# Patient Record
Sex: FEMALE | Race: OTHER | URBAN - METROPOLITAN AREA
[De-identification: names, ages, dates, MRNs, and addresses within clinical notes are randomized per-mention and may not be internally consistent; named-entity substitution may affect disease eponyms.]

---
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
## 2018-02-13 ENCOUNTER — EMERGENCY (EMERGENCY)
Facility: HOSPITAL | Age: 18
LOS: 0 days | Discharge: HOME | End: 2018-02-13
Attending: STUDENT IN AN ORGANIZED HEALTH CARE EDUCATION/TRAINING PROGRAM | Admitting: STUDENT IN AN ORGANIZED HEALTH CARE EDUCATION/TRAINING PROGRAM
Payer: SELF-PAY

## 2018-02-13 VITALS
OXYGEN SATURATION: 100 % | HEART RATE: 84 BPM | DIASTOLIC BLOOD PRESSURE: 60 MMHG | RESPIRATION RATE: 16 BRPM | SYSTOLIC BLOOD PRESSURE: 133 MMHG | TEMPERATURE: 98 F

## 2018-02-13 VITALS
WEIGHT: 156.09 LBS | TEMPERATURE: 98 F | HEART RATE: 89 BPM | DIASTOLIC BLOOD PRESSURE: 58 MMHG | HEIGHT: 62 IN | OXYGEN SATURATION: 100 % | SYSTOLIC BLOOD PRESSURE: 138 MMHG | RESPIRATION RATE: 18 BRPM

## 2018-02-13 DIAGNOSIS — Z91.5 PERSONAL HISTORY OF SELF-HARM: ICD-10-CM

## 2018-02-13 DIAGNOSIS — R69 ILLNESS, UNSPECIFIED: ICD-10-CM

## 2018-02-13 DIAGNOSIS — F43.29 ADJUSTMENT DISORDER WITH OTHER SYMPTOMS: ICD-10-CM

## 2018-02-13 DIAGNOSIS — Z79.899 OTHER LONG TERM (CURRENT) DRUG THERAPY: ICD-10-CM

## 2018-02-13 DIAGNOSIS — R45.851 SUICIDAL IDEATIONS: ICD-10-CM

## 2018-02-13 DIAGNOSIS — F17.210 NICOTINE DEPENDENCE, CIGARETTES, UNCOMPLICATED: ICD-10-CM

## 2018-02-13 DIAGNOSIS — F32.9 MAJOR DEPRESSIVE DISORDER, SINGLE EPISODE, UNSPECIFIED: ICD-10-CM

## 2018-02-13 LAB
ALBUMIN SERPL ELPH-MCNC: 3.5 G/DL — SIGNIFICANT CHANGE UP (ref 3.3–5)
ALP SERPL-CCNC: 99 U/L — SIGNIFICANT CHANGE UP (ref 40–120)
ALT FLD-CCNC: 25 U/L — SIGNIFICANT CHANGE UP (ref 12–78)
ANION GAP SERPL CALC-SCNC: 6 MMOL/L — SIGNIFICANT CHANGE UP (ref 5–17)
APAP SERPL-MCNC: <2 UG/ML — LOW (ref 10–30)
AST SERPL-CCNC: 14 U/L — LOW (ref 15–37)
BASOPHILS # BLD AUTO: 0 K/UL — SIGNIFICANT CHANGE UP (ref 0–0.2)
BASOPHILS NFR BLD AUTO: 0.3 % — SIGNIFICANT CHANGE UP (ref 0–2)
BILIRUB DIRECT SERPL-MCNC: 0.1 MG/DL — SIGNIFICANT CHANGE UP (ref 0–0.2)
BILIRUB INDIRECT FLD-MCNC: 0.4 MG/DL — SIGNIFICANT CHANGE UP (ref 0.2–1)
BILIRUB SERPL-MCNC: 0.5 MG/DL — SIGNIFICANT CHANGE UP (ref 0.2–1.2)
BUN SERPL-MCNC: 9 MG/DL — SIGNIFICANT CHANGE UP (ref 7–23)
CALCIUM SERPL-MCNC: 8.6 MG/DL — SIGNIFICANT CHANGE UP (ref 8.5–10.1)
CHLORIDE SERPL-SCNC: 107 MMOL/L — SIGNIFICANT CHANGE UP (ref 96–108)
CO2 SERPL-SCNC: 26 MMOL/L — SIGNIFICANT CHANGE UP (ref 22–31)
CREAT SERPL-MCNC: 0.58 MG/DL — SIGNIFICANT CHANGE UP (ref 0.5–1.3)
EOSINOPHIL # BLD AUTO: 0.2 K/UL — SIGNIFICANT CHANGE UP (ref 0–0.5)
EOSINOPHIL NFR BLD AUTO: 2.1 % — SIGNIFICANT CHANGE UP (ref 0–6)
ETHANOL SERPL-MCNC: <10 MG/DL — SIGNIFICANT CHANGE UP (ref 0–10)
GLUCOSE SERPL-MCNC: 108 MG/DL — HIGH (ref 70–99)
HCG SERPL-ACNC: <1 MIU/ML — SIGNIFICANT CHANGE UP
HCT VFR BLD CALC: 40.2 % — SIGNIFICANT CHANGE UP (ref 34.5–45)
HGB BLD-MCNC: 13.6 G/DL — SIGNIFICANT CHANGE UP (ref 11.5–15.5)
HIV 1 & 2 AB SERPL IA.RAPID: SIGNIFICANT CHANGE UP
LYMPHOCYTES # BLD AUTO: 2.1 K/UL — SIGNIFICANT CHANGE UP (ref 1–3.3)
LYMPHOCYTES # BLD AUTO: 21.6 % — SIGNIFICANT CHANGE UP (ref 13–44)
MCHC RBC-ENTMCNC: 29.1 PG — SIGNIFICANT CHANGE UP (ref 27–34)
MCHC RBC-ENTMCNC: 33.9 GM/DL — SIGNIFICANT CHANGE UP (ref 32–36)
MCV RBC AUTO: 85.9 FL — SIGNIFICANT CHANGE UP (ref 80–100)
MONOCYTES # BLD AUTO: 0.4 K/UL — SIGNIFICANT CHANGE UP (ref 0–0.9)
MONOCYTES NFR BLD AUTO: 4.1 % — SIGNIFICANT CHANGE UP (ref 2–14)
NEUTROPHILS # BLD AUTO: 6.9 K/UL — SIGNIFICANT CHANGE UP (ref 1.8–7.4)
NEUTROPHILS NFR BLD AUTO: 71.9 % — SIGNIFICANT CHANGE UP (ref 43–77)
PLATELET # BLD AUTO: 261 K/UL — SIGNIFICANT CHANGE UP (ref 150–400)
POTASSIUM SERPL-MCNC: 3.5 MMOL/L — SIGNIFICANT CHANGE UP (ref 3.5–5.3)
POTASSIUM SERPL-SCNC: 3.5 MMOL/L — SIGNIFICANT CHANGE UP (ref 3.5–5.3)
PROT SERPL-MCNC: 7.3 GM/DL — SIGNIFICANT CHANGE UP (ref 6–8.3)
RBC # BLD: 4.68 M/UL — SIGNIFICANT CHANGE UP (ref 3.8–5.2)
RBC # FLD: 12 % — SIGNIFICANT CHANGE UP (ref 10.3–14.5)
SALICYLATES SERPL-MCNC: <1.7 MG/DL — LOW (ref 2.8–20)
SODIUM SERPL-SCNC: 139 MMOL/L — SIGNIFICANT CHANGE UP (ref 135–145)
TSH SERPL-MCNC: 0.75 UU/ML — SIGNIFICANT CHANGE UP (ref 0.36–3.74)
WBC # BLD: 9.5 K/UL — SIGNIFICANT CHANGE UP (ref 3.8–10.5)
WBC # FLD AUTO: 9.5 K/UL — SIGNIFICANT CHANGE UP (ref 3.8–10.5)

## 2018-02-13 PROCEDURE — 99285 EMERGENCY DEPT VISIT HI MDM: CPT

## 2018-02-13 PROCEDURE — 93010 ELECTROCARDIOGRAM REPORT: CPT

## 2018-02-13 NOTE — ED PROVIDER NOTE - OBJECTIVE STATEMENT
16 yo female with suicidal ideation; no plan; patient reports that she has been having thoughts to hurt herself for " a long time"- patient does not see therapist or psychiatrist; has been hospitalized over 3 years ago for psychiatric illness; reports difficulty with dealing with stressors at home and school; patient's uncle also recently  per patient; reports that she "sometimes smokes, sometimes drinks alcohol," no illicit drug use.
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
## 2018-02-13 NOTE — ED PROVIDER NOTE - PROGRESS NOTE DETAILS
Khloe DO: Psych consult ordered; patient placed on 1:1 Khloe DO: S/o to Dr. Horne pending labs, psych eval. AJM: Pt receievd in signout. stable. seen by psych and cleared. outpatient follow up arranged by psych. will dc

## 2018-02-13 NOTE — ED BEHAVIORAL HEALTH ASSESSMENT NOTE - RISK ASSESSMENT
min risk of self harm, but elevated risk of unintentional self harm due to high risk, promiscuous behavior and ineffective coping skills.  Pt has no insurance an limited support groups.  Pt advised to discuss with school adviser.

## 2018-02-13 NOTE — ED ADULT NURSE NOTE - CHPI ED SYMPTOMS NEG
no weakness/no agitation/no weight loss/no homicidal/no hallucinations/no confusion/no disorientation/no paranoia/no suicidal/no change in level of consciousness
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
## 2018-02-13 NOTE — ED BEHAVIORAL HEALTH ASSESSMENT NOTE - SUICIDE PROTECTIVE FACTORS
Supportive social network or family/Responsibility to family and others/Future oriented/High spirituality
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
## 2018-02-13 NOTE — ED ADULT NURSE NOTE - OBJECTIVE STATEMENT
pt states increasing depression over the past 6 months. states decreased pleasure in activities, desire to be alone, crying, anxiety, and "wanting to be in the dark" states wanted to self mutilate however threw out her razor. denies SI, HI, hallucinations, drug or alcohol use.

## 2020-12-02 NOTE — ED ADULT NURSE NOTE - PRO INTERPRETER NEED 2
Verified name and  of patient. Mat, physical therapist at [facility], calling to report that he is currently treating patient and has noticed swelling to her left leg.   He states that patient reports that leg is tender to touch and t English

## 2022-03-25 ENCOUNTER — EMERGENCY (EMERGENCY)
Facility: HOSPITAL | Age: 22
LOS: 0 days | Discharge: HOME | End: 2022-03-26
Attending: EMERGENCY MEDICINE
Payer: MEDICAID

## 2022-03-25 VITALS
HEART RATE: 98 BPM | OXYGEN SATURATION: 100 % | SYSTOLIC BLOOD PRESSURE: 132 MMHG | TEMPERATURE: 98 F | DIASTOLIC BLOOD PRESSURE: 73 MMHG | RESPIRATION RATE: 19 BRPM

## 2022-03-25 VITALS — HEIGHT: 62 IN | WEIGHT: 195.11 LBS

## 2022-03-25 DIAGNOSIS — R53.1 WEAKNESS: ICD-10-CM

## 2022-03-25 DIAGNOSIS — R42 DIZZINESS AND GIDDINESS: ICD-10-CM

## 2022-03-25 DIAGNOSIS — F41.0 PANIC DISORDER [EPISODIC PAROXYSMAL ANXIETY]: ICD-10-CM

## 2022-03-25 DIAGNOSIS — F41.9 ANXIETY DISORDER, UNSPECIFIED: ICD-10-CM

## 2022-03-25 LAB
ALBUMIN SERPL ELPH-MCNC: 3.8 G/DL — SIGNIFICANT CHANGE UP (ref 3.3–5)
ALP SERPL-CCNC: 98 U/L — SIGNIFICANT CHANGE UP (ref 40–120)
ALT FLD-CCNC: 32 U/L — SIGNIFICANT CHANGE UP (ref 12–78)
ANION GAP SERPL CALC-SCNC: 4 MMOL/L — LOW (ref 5–17)
APPEARANCE UR: ABNORMAL
AST SERPL-CCNC: 20 U/L — SIGNIFICANT CHANGE UP (ref 15–37)
BILIRUB SERPL-MCNC: 0.5 MG/DL — SIGNIFICANT CHANGE UP (ref 0.2–1.2)
BILIRUB UR-MCNC: NEGATIVE — SIGNIFICANT CHANGE UP
BUN SERPL-MCNC: 13 MG/DL — SIGNIFICANT CHANGE UP (ref 7–23)
CALCIUM SERPL-MCNC: 9.4 MG/DL — SIGNIFICANT CHANGE UP (ref 8.5–10.1)
CHLORIDE SERPL-SCNC: 108 MMOL/L — SIGNIFICANT CHANGE UP (ref 96–108)
CO2 SERPL-SCNC: 27 MMOL/L — SIGNIFICANT CHANGE UP (ref 22–31)
COLOR SPEC: YELLOW — SIGNIFICANT CHANGE UP
CREAT SERPL-MCNC: 0.66 MG/DL — SIGNIFICANT CHANGE UP (ref 0.5–1.3)
DIFF PNL FLD: NEGATIVE — SIGNIFICANT CHANGE UP
EGFR: 126 ML/MIN/1.73M2 — SIGNIFICANT CHANGE UP
GLUCOSE SERPL-MCNC: 80 MG/DL — SIGNIFICANT CHANGE UP (ref 70–99)
GLUCOSE UR QL: NEGATIVE — SIGNIFICANT CHANGE UP
HCG SERPL-ACNC: <1 MIU/ML — SIGNIFICANT CHANGE UP
KETONES UR-MCNC: NEGATIVE — SIGNIFICANT CHANGE UP
LEUKOCYTE ESTERASE UR-ACNC: NEGATIVE — SIGNIFICANT CHANGE UP
NITRITE UR-MCNC: NEGATIVE — SIGNIFICANT CHANGE UP
PH UR: 6 — SIGNIFICANT CHANGE UP (ref 5–8)
POTASSIUM SERPL-MCNC: 4 MMOL/L — SIGNIFICANT CHANGE UP (ref 3.5–5.3)
POTASSIUM SERPL-SCNC: 4 MMOL/L — SIGNIFICANT CHANGE UP (ref 3.5–5.3)
PROT SERPL-MCNC: 7.9 GM/DL — SIGNIFICANT CHANGE UP (ref 6–8.3)
PROT UR-MCNC: NEGATIVE — SIGNIFICANT CHANGE UP
SODIUM SERPL-SCNC: 139 MMOL/L — SIGNIFICANT CHANGE UP (ref 135–145)
SP GR SPEC: 1.02 — SIGNIFICANT CHANGE UP (ref 1.01–1.02)
UROBILINOGEN FLD QL: 8

## 2022-03-25 PROCEDURE — 96361 HYDRATE IV INFUSION ADD-ON: CPT

## 2022-03-25 PROCEDURE — 70450 CT HEAD/BRAIN W/O DYE: CPT | Mod: MG

## 2022-03-25 PROCEDURE — 96374 THER/PROPH/DIAG INJ IV PUSH: CPT

## 2022-03-25 PROCEDURE — 36415 COLL VENOUS BLD VENIPUNCTURE: CPT

## 2022-03-25 PROCEDURE — 84702 CHORIONIC GONADOTROPIN TEST: CPT

## 2022-03-25 PROCEDURE — 85025 COMPLETE CBC W/AUTO DIFF WBC: CPT

## 2022-03-25 PROCEDURE — 87086 URINE CULTURE/COLONY COUNT: CPT

## 2022-03-25 PROCEDURE — 99284 EMERGENCY DEPT VISIT MOD MDM: CPT | Mod: 25

## 2022-03-25 PROCEDURE — 99285 EMERGENCY DEPT VISIT HI MDM: CPT

## 2022-03-25 PROCEDURE — 81001 URINALYSIS AUTO W/SCOPE: CPT

## 2022-03-25 PROCEDURE — 80053 COMPREHEN METABOLIC PANEL: CPT

## 2022-03-25 PROCEDURE — G1004: CPT

## 2022-03-25 PROCEDURE — 70450 CT HEAD/BRAIN W/O DYE: CPT | Mod: 26,MG

## 2022-03-25 RX ORDER — MECLIZINE HCL 12.5 MG
25 TABLET ORAL ONCE
Refills: 0 | Status: COMPLETED | OUTPATIENT
Start: 2022-03-25 | End: 2022-03-25

## 2022-03-25 RX ORDER — ONDANSETRON 8 MG/1
4 TABLET, FILM COATED ORAL ONCE
Refills: 0 | Status: COMPLETED | OUTPATIENT
Start: 2022-03-25 | End: 2022-03-25

## 2022-03-25 RX ORDER — SODIUM CHLORIDE 9 MG/ML
1000 INJECTION INTRAMUSCULAR; INTRAVENOUS; SUBCUTANEOUS ONCE
Refills: 0 | Status: COMPLETED | OUTPATIENT
Start: 2022-03-25 | End: 2022-03-25

## 2022-03-25 RX ADMIN — SODIUM CHLORIDE 1000 MILLILITER(S): 9 INJECTION INTRAMUSCULAR; INTRAVENOUS; SUBCUTANEOUS at 23:00

## 2022-03-25 RX ADMIN — Medication 1 MILLIGRAM(S): at 22:59

## 2022-03-25 RX ADMIN — Medication 25 MILLIGRAM(S): at 22:59

## 2022-03-25 NOTE — ED ADULT NURSE NOTE - CHIEF COMPLAINT QUOTE
reports panic attacks presenting as episodes of heart racing and lightheadedness over past couple of days. states she feels like she is going to pass out when they happen, symptoms resolving after a couple of minutes.  denies meds, denies psych history

## 2022-03-25 NOTE — ED ADULT NURSE NOTE - OBJECTIVE STATEMENT
Pt A&Ox4. Reports having panic attacks, anxiety, palpitations, and lightheadedness over past couple of days.  Anxiety began about a year ago. States she feels like she is going to pass out when symptoms are present. Symptoms self resolves after a couple of minutes. Denies taking medication. Denies psych history. Denies SI. Pt A&Ox4. Reports having panic attacks, anxiety, palpitations, and lightheadedness over past couple of days. Patient states she had anxiety for years, but started becoming extremely anxious began about a year ago after a divorce. States she get really bad stress hives. Denies any allergies. States she feels like she is going to pass out when symptoms are present. Symptoms self resolves after a couple of minutes. Denies taking medication. Denies psych history. Denies SI.

## 2022-03-25 NOTE — ED STATDOCS - PROGRESS NOTE DETAILS
pt reeval offers no complaints, pt awaiting imaging results will sign out to night staff to follow. -Miriam Rodriguez PA-C pt told of results.   pt states feels better and will f/u

## 2022-03-25 NOTE — ED STATDOCS - ATTENDING CONTRIBUTION TO CARE
I, Otis Tobar, performed the initial face to face bedside interview with this patient regarding history of present illness, review of symptoms and relevant past medical, social and family history.  I completed an independent physical examination.  I was the initial provider who evaluated this patient. I have signed out the follow up of any pending tests (i.e. labs, radiological studies) to the MONALISA.  I have communicated the patient’s plan of care and disposition with the MONAL[facility].  The history, relevant review of systems, past medical and surgical history, medical decision making, and physical examination was documented by the scribe in my presence and I attest to the accuracy of the documentation.    pt with anxiety, weakness and dizziness.   anxious appearing.   will CT, labs, UA, IVF, meds and reeval

## 2022-03-25 NOTE — ED STATDOCS - NS ED ATTENDING STATEMENT MOD
This was a shared visit with the MONALISA. I reviewed and verified the documentation and independently performed the documented:

## 2022-03-25 NOTE — ED ADULT TRIAGE NOTE - CHIEF COMPLAINT QUOTE
reports panic attacks presenting as episodes of heart racing and lightheadedness reports panic attacks presenting as episodes of heart racing and lightheadedness over past couple of days. states she feels like she is going to pass out when they happen, symptoms resolving after a couple of minutes.  denies meds, denies psych history

## 2022-03-25 NOTE — ED STATDOCS - OBJECTIVE STATEMENT
20 y/o female with a PMHx of anxiety presents to the ED with dizziness and weakness x couple of days. Pt reports this is worse than  her usual panic attack. Pt has no other complaints at this time. No meds were taken PTA.

## 2022-03-25 NOTE — ED STATDOCS - PATIENT PORTAL LINK FT
You can access the FollowMyHealth Patient Portal offered by [facility] by registering at the following website: [email or web address]. By joining [employer]’s FollowMyHealth portal, you will also be able to view your health information using other applications (apps) compatible with our system.

## 2022-03-26 RX ADMIN — SODIUM CHLORIDE 1000 MILLILITER(S): 9 INJECTION INTRAMUSCULAR; INTRAVENOUS; SUBCUTANEOUS at 00:00

## 2022-03-27 LAB
CULTURE RESULTS: SIGNIFICANT CHANGE UP
SPECIMEN SOURCE: SIGNIFICANT CHANGE UP

## 2022-12-16 ENCOUNTER — EMERGENCY (EMERGENCY)
Facility: HOSPITAL | Age: 22
LOS: 0 days | Discharge: HOME | End: 2022-12-17
Attending: EMERGENCY MEDICINE
Payer: MEDICAID

## 2022-12-16 VITALS
OXYGEN SATURATION: 98 % | RESPIRATION RATE: 18 BRPM | HEIGHT: 62 IN | SYSTOLIC BLOOD PRESSURE: 115 MMHG | DIASTOLIC BLOOD PRESSURE: 92 MMHG | WEIGHT: 199.96 LBS | TEMPERATURE: 99 F | HEART RATE: 118 BPM

## 2022-12-16 DIAGNOSIS — R50.9 FEVER, UNSPECIFIED: ICD-10-CM

## 2022-12-16 DIAGNOSIS — R05.9 COUGH, UNSPECIFIED: ICD-10-CM

## 2022-12-16 DIAGNOSIS — R09.81 NASAL CONGESTION: ICD-10-CM

## 2022-12-16 DIAGNOSIS — Z86.16 PERSONAL HISTORY OF COVID-19: ICD-10-CM

## 2022-12-16 DIAGNOSIS — J11.1 INFLUENZA DUE TO UNIDENTIFIED INFLUENZA VIRUS WITH OTHER RESPIRATORY MANIFESTATIONS: ICD-10-CM

## 2022-12-16 DIAGNOSIS — Z20.822 CONTACT WITH AND (SUSPECTED) EXPOSURE TO COVID-19: ICD-10-CM

## 2022-12-16 DIAGNOSIS — F41.9 ANXIETY DISORDER, UNSPECIFIED: ICD-10-CM

## 2022-12-16 PROCEDURE — 71045 X-RAY EXAM CHEST 1 VIEW: CPT

## 2022-12-16 PROCEDURE — 80053 COMPREHEN METABOLIC PANEL: CPT

## 2022-12-16 PROCEDURE — 36415 COLL VENOUS BLD VENIPUNCTURE: CPT

## 2022-12-16 PROCEDURE — 85379 FIBRIN DEGRADATION QUANT: CPT

## 2022-12-16 PROCEDURE — 84702 CHORIONIC GONADOTROPIN TEST: CPT

## 2022-12-16 PROCEDURE — 99053 MED SERV 10PM-8AM 24 HR FAC: CPT

## 2022-12-16 PROCEDURE — 99283 EMERGENCY DEPT VISIT LOW MDM: CPT | Mod: 25

## 2022-12-16 PROCEDURE — 85025 COMPLETE CBC W/AUTO DIFF WBC: CPT

## 2022-12-16 PROCEDURE — 0241U: CPT

## 2022-12-16 PROCEDURE — 84484 ASSAY OF TROPONIN QUANT: CPT

## 2022-12-16 PROCEDURE — 99285 EMERGENCY DEPT VISIT HI MDM: CPT

## 2022-12-16 RX ORDER — SODIUM CHLORIDE 9 MG/ML
1000 INJECTION INTRAMUSCULAR; INTRAVENOUS; SUBCUTANEOUS ONCE
Refills: 0 | Status: COMPLETED | OUTPATIENT
Start: 2022-12-16 | End: 2022-12-16

## 2022-12-16 NOTE — ED ADULT TRIAGE NOTE - CHIEF COMPLAINT QUOTE
pt complaining of fever and "feeling sick" for a couple weeks.  pt tested positive for covid on 12/8.  pt states she can not get rid of her fever.  unknown TMAX due to patient not checking temperature

## 2022-12-17 VITALS
OXYGEN SATURATION: 99 % | RESPIRATION RATE: 20 BRPM | HEART RATE: 106 BPM | SYSTOLIC BLOOD PRESSURE: 132 MMHG | TEMPERATURE: 97 F | DIASTOLIC BLOOD PRESSURE: 69 MMHG

## 2022-12-17 PROBLEM — F41.9 ANXIETY DISORDER, UNSPECIFIED: Chronic | Status: ACTIVE | Noted: 2022-03-26

## 2022-12-17 LAB
ALBUMIN SERPL ELPH-MCNC: 3.3 G/DL — SIGNIFICANT CHANGE UP (ref 3.3–5)
ALP SERPL-CCNC: 94 U/L — SIGNIFICANT CHANGE UP (ref 40–120)
ALT FLD-CCNC: 39 U/L — SIGNIFICANT CHANGE UP (ref 12–78)
ANION GAP SERPL CALC-SCNC: 6 MMOL/L — SIGNIFICANT CHANGE UP (ref 5–17)
AST SERPL-CCNC: 30 U/L — SIGNIFICANT CHANGE UP (ref 15–37)
BASOPHILS # BLD AUTO: 0.02 K/UL — SIGNIFICANT CHANGE UP (ref 0–0.2)
BASOPHILS NFR BLD AUTO: 0.3 % — SIGNIFICANT CHANGE UP (ref 0–2)
BILIRUB SERPL-MCNC: 0.8 MG/DL — SIGNIFICANT CHANGE UP (ref 0.2–1.2)
BUN SERPL-MCNC: 11 MG/DL — SIGNIFICANT CHANGE UP (ref 7–23)
CALCIUM SERPL-MCNC: 8.8 MG/DL — SIGNIFICANT CHANGE UP (ref 8.5–10.1)
CHLORIDE SERPL-SCNC: 105 MMOL/L — SIGNIFICANT CHANGE UP (ref 96–108)
CO2 SERPL-SCNC: 27 MMOL/L — SIGNIFICANT CHANGE UP (ref 22–31)
CREAT SERPL-MCNC: 0.64 MG/DL — SIGNIFICANT CHANGE UP (ref 0.5–1.3)
D DIMER BLD IA.RAPID-MCNC: 181 NG/ML DDU — SIGNIFICANT CHANGE UP
EGFR: 126 ML/MIN/1.73M2 — SIGNIFICANT CHANGE UP
EOSINOPHIL # BLD AUTO: 0.07 K/UL — SIGNIFICANT CHANGE UP (ref 0–0.5)
EOSINOPHIL NFR BLD AUTO: 1.2 % — SIGNIFICANT CHANGE UP (ref 0–6)
FLUAV AG NPH QL: DETECTED
FLUBV AG NPH QL: SIGNIFICANT CHANGE UP
GLUCOSE SERPL-MCNC: 106 MG/DL — HIGH (ref 70–99)
HCT VFR BLD CALC: 37.1 % — SIGNIFICANT CHANGE UP (ref 34.5–45)
HGB BLD-MCNC: 12.7 G/DL — SIGNIFICANT CHANGE UP (ref 11.5–15.5)
IMM GRANULOCYTES NFR BLD AUTO: 0.3 % — SIGNIFICANT CHANGE UP (ref 0–0.9)
LYMPHOCYTES # BLD AUTO: 1.73 K/UL — SIGNIFICANT CHANGE UP (ref 1–3.3)
LYMPHOCYTES # BLD AUTO: 28.6 % — SIGNIFICANT CHANGE UP (ref 13–44)
MCHC RBC-ENTMCNC: 29.1 PG — SIGNIFICANT CHANGE UP (ref 27–34)
MCHC RBC-ENTMCNC: 34.2 GM/DL — SIGNIFICANT CHANGE UP (ref 32–36)
MCV RBC AUTO: 85.1 FL — SIGNIFICANT CHANGE UP (ref 80–100)
MONOCYTES # BLD AUTO: 0.57 K/UL — SIGNIFICANT CHANGE UP (ref 0–0.9)
MONOCYTES NFR BLD AUTO: 9.4 % — SIGNIFICANT CHANGE UP (ref 2–14)
NEUTROPHILS # BLD AUTO: 3.64 K/UL — SIGNIFICANT CHANGE UP (ref 1.8–7.4)
NEUTROPHILS NFR BLD AUTO: 60.2 % — SIGNIFICANT CHANGE UP (ref 43–77)
PLATELET # BLD AUTO: 219 K/UL — SIGNIFICANT CHANGE UP (ref 150–400)
POTASSIUM SERPL-MCNC: 3.5 MMOL/L — SIGNIFICANT CHANGE UP (ref 3.5–5.3)
POTASSIUM SERPL-SCNC: 3.5 MMOL/L — SIGNIFICANT CHANGE UP (ref 3.5–5.3)
PROT SERPL-MCNC: 7.5 GM/DL — SIGNIFICANT CHANGE UP (ref 6–8.3)
RBC # BLD: 4.36 M/UL — SIGNIFICANT CHANGE UP (ref 3.8–5.2)
RBC # FLD: 12.9 % — SIGNIFICANT CHANGE UP (ref 10.3–14.5)
RSV RNA NPH QL NAA+NON-PROBE: SIGNIFICANT CHANGE UP
SARS-COV-2 RNA SPEC QL NAA+PROBE: SIGNIFICANT CHANGE UP
SODIUM SERPL-SCNC: 138 MMOL/L — SIGNIFICANT CHANGE UP (ref 135–145)
TROPONIN I, HIGH SENSITIVITY RESULT: 3.6 NG/L — SIGNIFICANT CHANGE UP
WBC # BLD: 6.05 K/UL — SIGNIFICANT CHANGE UP (ref 3.8–10.5)
WBC # FLD AUTO: 6.05 K/UL — SIGNIFICANT CHANGE UP (ref 3.8–10.5)

## 2022-12-17 PROCEDURE — 71045 X-RAY EXAM CHEST 1 VIEW: CPT | Mod: 26

## 2022-12-17 RX ADMIN — SODIUM CHLORIDE 2000 MILLILITER(S): 9 INJECTION INTRAMUSCULAR; INTRAVENOUS; SUBCUTANEOUS at 00:55

## 2022-12-17 NOTE — ED PROVIDER NOTE - PATIENT PORTAL LINK FT
You can access the FollowMyHealth Patient Portal offered by [facility] by registering at the following website: [email or web address]. By joining [employer]’s FollowMyHealth portal, you will also be able to view your health information using other applications (apps) compatible with our system.

## 2022-12-17 NOTE — ED PROVIDER NOTE - NSFOLLOWUPINSTRUCTIONS_ED_ALL_ED_FT
please follow up with your doctor in 2-3 days.    take motrin and tylenol for fever and pain.   drink plenty of fluids.   return to ED for any concerns

## 2022-12-17 NOTE — ED PROVIDER NOTE - PROGRESS NOTE DETAILS
pt told of results.   states will f/u.   agree with plan for d/c home and will take motrin and tylenol at home

## 2023-01-21 NOTE — ED BEHAVIORAL HEALTH ASSESSMENT NOTE - NS ED BHA DEMOGRAPHICS RACE
0800: Bedside and Verbal shift change report given to Ezra Jacobs, RN (oncoming nurse) by Patricia Crystal. Katlin Chakraborty RN (offgoing nurse). Report included the following information SBAR.      1153: I have reviewed discharge instructions with the patient. The patient verbalized understanding. 1830- TRANSFER - IN REPORT:    Verbal report received from Lindsay Bell RN(name) on [facility]  being received from L&D(unit) for routine progression of care      Report consisted of patients Situation, Background, Assessment and   Recommendations(SBAR). Information from the following report(s) SBAR was reviewed with the receiving nurse. Opportunity for questions and clarification was provided. Assessment completed upon patients arrival to unit and care assumed. 1930: Bedside shift change report given to TARA Singh RN (oncoming nurse) by Gwendolyn Clifton Harry Medeiros [facility] on patient status  5424: Bedside shift change report given to Rashel Vega RN (oncoming nurse) by Vijay Nguyen RN (offgoing nurse). Report included the following information SBAR. 37 y/o woman with hx of seizure, anxiety, substance abuse recently in detox and rehab, now here due to multiple seizure like events. These events are likely non-epileptic, unknown if pt has underlying organic epilepsy.    - Admit to EMU  - continuous EEG monitoring  - No benzos or pain medications to be given on PRN basis other than what she came in on  - Constant observation  - CIWA protocol w/ symptom triggered  - continue with patients previous meds  - check cbc, cmp, mg, phos, keppra level, lamotrigine level  /

## 2023-09-13 NOTE — ED PROVIDER NOTE - DISCHARGE REVIEW MATERIAL PRESENTED
. Enhanced Features Information: The enhanced features will allow you to make use of the built in histology library which is used when documenting the individual stages. In this enhanced mode you will not have to select a frozen section diagnosis as this will automatically be based on the impression chosen to start the diagnosis. The parent diagnosis will also be overridden if you select a different microscopic description. The enhanced features will allow you develop a small library of gross descriptions to use as well. If you prefer the old method please leave the Use Enhanced Frozen Section Features question set to No.

## 2024-02-22 ENCOUNTER — EMERGENCY (EMERGENCY)
Facility: HOSPITAL | Age: 24
LOS: 1 days | Discharge: HOME | End: 2024-02-22
Attending: STUDENT IN AN ORGANIZED HEALTH CARE EDUCATION/TRAINING PROGRAM | Admitting: STUDENT IN AN ORGANIZED HEALTH CARE EDUCATION/TRAINING PROGRAM
Payer: MEDICAID

## 2024-02-22 VITALS
HEART RATE: 95 BPM | TEMPERATURE: 98 F | OXYGEN SATURATION: 98 % | SYSTOLIC BLOOD PRESSURE: 107 MMHG | DIASTOLIC BLOOD PRESSURE: 65 MMHG | HEIGHT: 62 IN | RESPIRATION RATE: 17 BRPM | WEIGHT: 240.08 LBS

## 2024-02-22 VITALS
SYSTOLIC BLOOD PRESSURE: 97 MMHG | DIASTOLIC BLOOD PRESSURE: 50 MMHG | TEMPERATURE: 99 F | OXYGEN SATURATION: 100 % | HEART RATE: 97 BPM | RESPIRATION RATE: 17 BRPM

## 2024-02-22 LAB
ALBUMIN SERPL ELPH-MCNC: 3.7 G/DL — SIGNIFICANT CHANGE UP (ref 3.3–5)
ALP SERPL-CCNC: 68 U/L — SIGNIFICANT CHANGE UP (ref 30–120)
ALT FLD-CCNC: 21 U/L — SIGNIFICANT CHANGE UP (ref 10–60)
ANION GAP SERPL CALC-SCNC: 13 MMOL/L — SIGNIFICANT CHANGE UP (ref 5–17)
APPEARANCE UR: CLEAR — SIGNIFICANT CHANGE UP
APTT BLD: 30.7 SEC — SIGNIFICANT CHANGE UP (ref 24.5–35.6)
AST SERPL-CCNC: 13 U/L — SIGNIFICANT CHANGE UP (ref 10–40)
BASOPHILS # BLD AUTO: 0.02 K/UL — SIGNIFICANT CHANGE UP (ref 0–0.2)
BASOPHILS NFR BLD AUTO: 0.1 % — SIGNIFICANT CHANGE UP (ref 0–2)
BILIRUB SERPL-MCNC: 0.6 MG/DL — SIGNIFICANT CHANGE UP (ref 0.2–1.2)
BILIRUB UR-MCNC: NEGATIVE — SIGNIFICANT CHANGE UP
BLD GP AB SCN SERPL QL: SIGNIFICANT CHANGE UP
BUN SERPL-MCNC: 11 MG/DL — SIGNIFICANT CHANGE UP (ref 7–23)
CALCIUM SERPL-MCNC: 9.3 MG/DL — SIGNIFICANT CHANGE UP (ref 8.4–10.5)
CHLORIDE SERPL-SCNC: 100 MMOL/L — SIGNIFICANT CHANGE UP (ref 96–108)
CO2 SERPL-SCNC: 24 MMOL/L — SIGNIFICANT CHANGE UP (ref 22–31)
COLOR SPEC: YELLOW — SIGNIFICANT CHANGE UP
CREAT SERPL-MCNC: 0.71 MG/DL — SIGNIFICANT CHANGE UP (ref 0.5–1.3)
DIFF PNL FLD: NEGATIVE — SIGNIFICANT CHANGE UP
EGFR: 121 ML/MIN/1.73M2 — SIGNIFICANT CHANGE UP
EOSINOPHIL # BLD AUTO: 0.03 K/UL — SIGNIFICANT CHANGE UP (ref 0–0.5)
EOSINOPHIL NFR BLD AUTO: 0.2 % — SIGNIFICANT CHANGE UP (ref 0–6)
GLUCOSE SERPL-MCNC: 100 MG/DL — HIGH (ref 70–99)
GLUCOSE UR QL: NEGATIVE MG/DL — SIGNIFICANT CHANGE UP
HCG SERPL-ACNC: HIGH MIU/ML
HCT VFR BLD CALC: 37.4 % — SIGNIFICANT CHANGE UP (ref 34.5–45)
HGB BLD-MCNC: 12.8 G/DL — SIGNIFICANT CHANGE UP (ref 11.5–15.5)
IMM GRANULOCYTES NFR BLD AUTO: 0.4 % — SIGNIFICANT CHANGE UP (ref 0–0.9)
INR BLD: 1.18 RATIO — SIGNIFICANT CHANGE UP (ref 0.85–1.18)
KETONES UR-MCNC: 80 MG/DL
LEUKOCYTE ESTERASE UR-ACNC: NEGATIVE — SIGNIFICANT CHANGE UP
LIDOCAIN IGE QN: 19 U/L — SIGNIFICANT CHANGE UP (ref 16–77)
LYMPHOCYTES # BLD AUTO: 1.01 K/UL — SIGNIFICANT CHANGE UP (ref 1–3.3)
LYMPHOCYTES # BLD AUTO: 7.3 % — LOW (ref 13–44)
MCHC RBC-ENTMCNC: 29.4 PG — SIGNIFICANT CHANGE UP (ref 27–34)
MCHC RBC-ENTMCNC: 34.2 GM/DL — SIGNIFICANT CHANGE UP (ref 32–36)
MCV RBC AUTO: 86 FL — SIGNIFICANT CHANGE UP (ref 80–100)
MONOCYTES # BLD AUTO: 0.51 K/UL — SIGNIFICANT CHANGE UP (ref 0–0.9)
MONOCYTES NFR BLD AUTO: 3.7 % — SIGNIFICANT CHANGE UP (ref 2–14)
NEUTROPHILS # BLD AUTO: 12.29 K/UL — HIGH (ref 1.8–7.4)
NEUTROPHILS NFR BLD AUTO: 88.3 % — HIGH (ref 43–77)
NITRITE UR-MCNC: NEGATIVE — SIGNIFICANT CHANGE UP
NRBC # BLD: 0 /100 WBCS — SIGNIFICANT CHANGE UP (ref 0–0)
PH UR: 5.5 — SIGNIFICANT CHANGE UP (ref 5–8)
PLATELET # BLD AUTO: 247 K/UL — SIGNIFICANT CHANGE UP (ref 150–400)
POTASSIUM SERPL-MCNC: 3.6 MMOL/L — SIGNIFICANT CHANGE UP (ref 3.5–5.3)
POTASSIUM SERPL-SCNC: 3.6 MMOL/L — SIGNIFICANT CHANGE UP (ref 3.5–5.3)
PROT SERPL-MCNC: 7.9 G/DL — SIGNIFICANT CHANGE UP (ref 6–8.3)
PROT UR-MCNC: NEGATIVE MG/DL — SIGNIFICANT CHANGE UP
PROTHROM AB SERPL-ACNC: 13.2 SEC — HIGH (ref 9.5–13)
RBC # BLD: 4.35 M/UL — SIGNIFICANT CHANGE UP (ref 3.8–5.2)
RBC # FLD: 12.3 % — SIGNIFICANT CHANGE UP (ref 10.3–14.5)
SODIUM SERPL-SCNC: 137 MMOL/L — SIGNIFICANT CHANGE UP (ref 135–145)
SP GR SPEC: 1.03 — SIGNIFICANT CHANGE UP (ref 1–1.03)
UROBILINOGEN FLD QL: 1 MG/DL — SIGNIFICANT CHANGE UP (ref 0.2–1)
WBC # BLD: 13.92 K/UL — HIGH (ref 3.8–10.5)
WBC # FLD AUTO: 13.92 K/UL — HIGH (ref 3.8–10.5)

## 2024-02-22 PROCEDURE — 76801 OB US < 14 WKS SINGLE FETUS: CPT

## 2024-02-22 PROCEDURE — 86850 RBC ANTIBODY SCREEN: CPT

## 2024-02-22 PROCEDURE — 96375 TX/PRO/DX INJ NEW DRUG ADDON: CPT

## 2024-02-22 PROCEDURE — 85025 COMPLETE CBC W/AUTO DIFF WBC: CPT

## 2024-02-22 PROCEDURE — 80053 COMPREHEN METABOLIC PANEL: CPT

## 2024-02-22 PROCEDURE — 86901 BLOOD TYPING SEROLOGIC RH(D): CPT

## 2024-02-22 PROCEDURE — 96365 THER/PROPH/DIAG IV INF INIT: CPT

## 2024-02-22 PROCEDURE — 85730 THROMBOPLASTIN TIME PARTIAL: CPT

## 2024-02-22 PROCEDURE — 99285 EMERGENCY DEPT VISIT HI MDM: CPT

## 2024-02-22 PROCEDURE — 96361 HYDRATE IV INFUSION ADD-ON: CPT

## 2024-02-22 PROCEDURE — 99285 EMERGENCY DEPT VISIT HI MDM: CPT | Mod: 25

## 2024-02-22 PROCEDURE — 93005 ELECTROCARDIOGRAM TRACING: CPT

## 2024-02-22 PROCEDURE — 76801 OB US < 14 WKS SINGLE FETUS: CPT | Mod: 26

## 2024-02-22 PROCEDURE — 81003 URINALYSIS AUTO W/O SCOPE: CPT

## 2024-02-22 PROCEDURE — 83690 ASSAY OF LIPASE: CPT

## 2024-02-22 PROCEDURE — 84702 CHORIONIC GONADOTROPIN TEST: CPT

## 2024-02-22 PROCEDURE — 93010 ELECTROCARDIOGRAM REPORT: CPT

## 2024-02-22 PROCEDURE — 36415 COLL VENOUS BLD VENIPUNCTURE: CPT

## 2024-02-22 PROCEDURE — 85610 PROTHROMBIN TIME: CPT

## 2024-02-22 PROCEDURE — 86900 BLOOD TYPING SEROLOGIC ABO: CPT

## 2024-02-22 RX ORDER — SODIUM CHLORIDE 9 MG/ML
1000 INJECTION INTRAMUSCULAR; INTRAVENOUS; SUBCUTANEOUS ONCE
Refills: 0 | Status: COMPLETED | OUTPATIENT
Start: 2024-02-22 | End: 2024-02-22

## 2024-02-22 RX ORDER — ONDANSETRON 8 MG/1
4 TABLET, FILM COATED ORAL ONCE
Refills: 0 | Status: COMPLETED | OUTPATIENT
Start: 2024-02-22 | End: 2024-02-22

## 2024-02-22 RX ORDER — ACETAMINOPHEN 500 MG
1000 TABLET ORAL ONCE
Refills: 0 | Status: COMPLETED | OUTPATIENT
Start: 2024-02-22 | End: 2024-02-22

## 2024-02-22 RX ORDER — ONDANSETRON 8 MG/1
1 TABLET, FILM COATED ORAL
Qty: 15 | Refills: 0
Start: 2024-02-22 | End: 2024-02-26

## 2024-02-22 RX ADMIN — SODIUM CHLORIDE 1000 MILLILITER(S): 9 INJECTION INTRAMUSCULAR; INTRAVENOUS; SUBCUTANEOUS at 15:17

## 2024-02-22 RX ADMIN — SODIUM CHLORIDE 1000 MILLILITER(S): 9 INJECTION INTRAMUSCULAR; INTRAVENOUS; SUBCUTANEOUS at 16:33

## 2024-02-22 RX ADMIN — Medication 1000 MILLIGRAM(S): at 16:05

## 2024-02-22 RX ADMIN — Medication 1000 MILLIGRAM(S): at 15:35

## 2024-02-22 RX ADMIN — Medication 400 MILLIGRAM(S): at 15:17

## 2024-02-22 RX ADMIN — ONDANSETRON 4 MILLIGRAM(S): 8 TABLET, FILM COATED ORAL at 15:17

## 2024-02-22 NOTE — ED ADULT TRIAGE NOTE - CHIEF COMPLAINT QUOTE
24 y/o female presents axo4 ambulatory c/o nausea/vomiting. pt reports being about 6 weeks pregnant [id number] with an  scheduled for tomorrow. pt reports intermittent n/v over the past couple of weeks but became worse today, vomited about 7-9 times and is unable to retain any liquids/solids.

## 2024-02-22 NOTE — ED PROVIDER NOTE - PATIENT PORTAL LINK FT
You can access the FollowMyHealth Patient Portal offered by [facility] by registering at the following website: [email or web address]. By joining [employer]’s FollowMyHealth portal, you will also be able to view your health information using other applications (apps) compatible with our system.

## 2024-02-22 NOTE — ED PROVIDER NOTE - NSFOLLOWUPINSTRUCTIONS_ED_ALL_ED_FT
Please follow up with your Primary Care Physician and any specialists as discussed- follow up e [facility] or Dr Hopper.  Please take your medications as prescribed and or instructed.  If your symptoms persist or worsen, please seek care. Either return to the Emergency Department, go to urgent care or see your primary care doctor.  Please refer to general information and instructions attached or below:     Pregnancy    WHAT YOU NEED TO KNOW:    A normal pregnancy lasts about 40 weeks. The first trimester lasts from your last period through the 12th week of pregnancy. The second trimester lasts from the 13th week of your pregnancy through the 23rd week. The third trimester lasts from your 24th week of pregnancy until your baby is born. If you know the date of your last period, your healthcare provider can estimate your due date. You may give birth to your baby any time from 37 weeks to 2 weeks after your due date.    DISCHARGE INSTRUCTIONS:    Return to the emergency department if:     You develop a severe headache that does not go away.      You have new or increased vision changes, such as blurred or spotted vision.      You have new or increased swelling in your face or hands.      You have pain or cramping in your abdomen or low back.      You have vaginal bleeding.    Contact your healthcare provider or obstetrician if:     You have abdominal cramps, pressure, or tightening.      You have a change in vaginal discharge.      You cannot keep food or drinks down, and you are losing weight.      You have chills or a fever.      You have vaginal itching, burning, or pain.       You have yellow, green, white, or foul-smelling vaginal discharge.      You have pain or burning when you urinate, less urine than usual, or pink or bloody urine.      You have questions or concerns about your condition or care.    Medicines:     Prenatal vitamins provide some of the extra vitamins and minerals you need during pregnancy. Prenatal vitamins may also help to decrease the risk of certain birth defects.       Take your medicine as directed. Contact your healthcare provider if you think your medicine is not helping or if you have side effects. Tell him or her if you are allergic to any medicine. Keep a list of the medicines, vitamins, and herbs you take. Include the amounts, and when and why you take them. Bring the list or the pill bottles to follow-up visits. Carry your medicine list with you in case of an emergency.    Follow up with your healthcare provider or obstetrician as directed: Go to all of your prenatal visits during your pregnancy. Write down your questions so you remember to ask them during your visits.    Body changes that may occur during your pregnancy:     Breast changes you will experience include tenderness and tingling during the early part of your pregnancy. Your breasts will become larger. You may need to use a support bra. You may see a thin, yellow fluid, called colostrum, leak from your nipples during the second trimester. Colostrum is a liquid that changes to milk about 3 days after you give birth.      Skin changes and stretch marks may occur during your pregnancy. You may have red marks, called stretch marks, on your skin. Stretch marks will usually fade after pregnancy. Use lotion if your skin is dry and itchy. The skin on your face, around your nipples, and below your belly button may darken. Most of the time, your skin will return to its normal color after your baby is born.       Morning sickness is nausea and vomiting that can happen at any time of day. Avoid fatty and spicy foods. Eat small meals throughout the day instead of large meals. Lynette may help to decrease nausea. Ask your healthcare provider about other ways of decreasing nausea and vomiting.      Heartburn may be caused by changes in your hormones during pregnancy. Your growing uterus may also push your stomach upward and force stomach acid to back up into your esophagus. Eat 4 or 5 small meals each day instead of large meals. Avoid spicy foods. Avoid eating right before bedtime.      Constipation may develop during your pregnancy. To treat constipation, eat foods high in fiber such as fiber cereals, beans, fruits, vegetables, whole-grain breads, and prune juice. Get regular exercise and drink plenty of water. Your healthcare provider may also suggest a fiber supplement to soften your bowel movements. Talk to your healthcare provider before you use any medicines to decrease constipation.      Hemorrhoids are enlarged veins in the rectal area. They may cause pain, itching, and bright red bleeding from your rectum. To decrease your risk of hemorrhoids, prevent constipation and do not strain to have a bowel movement. If you have hemorrhoids, soak in a tub of warm water to ease discomfort. Ask your healthcare provider how you can treat hemorrhoids.       Leg cramps and swelling may be caused by low calcium levels or the added weight of pregnancy. Raise your legs above the level of your heart to decrease swelling. During a leg cramp, stretch or massage the muscle that has the cramp. Heat may help decrease pain and muscle spasms. Apply heat on your muscle for 20 to 30 minutes every 2 hours for as many days as directed.      Back pain may occur as your baby grows. Do not stand for long periods of time or lift heavy items. Use good posture while you stand, squat, or bend. Wear low-heeled shoes with good support. Rest may also help to relieve back pain. Ask your healthcare provider about exercises you can do to strengthen your back muscles.     Stay healthy during your pregnancy:     Eat a variety of healthy foods. Healthy foods include fruits, vegetables, whole-grain breads, low-fat dairy foods, beans, lean meats, and fish. Drink liquids as directed. Ask how much liquid to drink each day and which liquids are best for you. Limit caffeine to less than 200 milligrams each day. Limit your intake of fish to 2 servings each week. Choose fish low in mercury such as canned light tuna, shrimp, crab, salmon, cod, or tilapia. Do not eat fish high in mercury such as swordfish, tilefish, shruthi mackerel, and shark.       Take prenatal vitamins as directed. Your need for certain vitamins and minerals, such as folic acid, increases during pregnancy. Prenatal vitamins provide some of the extra vitamins and minerals you need. Prenatal vitamins may also help to decrease the risk of certain birth defects.       Ask how much weight you should gain during your pregnancy. Too much or too little weight gain can be unhealthy for you and your baby.       Talk to your healthcare provider about exercise. Moderate exercise can help you stay fit. Your healthcare provider will help you plan an exercise program that is safe for you during pregnancy.       Do not smoke. Smoking increases your risk of a miscarriage and other health problems during your pregnancy. Smoking can cause your baby to be born too early or weigh less at birth. Quit smoking as soon as you think you might be pregnant. Ask your healthcare provider for information if you need help quitting.      Do not drink alcohol. Alcohol passes from your body to your baby through the placenta. It can affect your baby's brain development and cause fetal alcohol syndrome (FAS). FAS is a group of conditions that causes mental, behavior, and growth problems.       Talk to your healthcare provider before you take any medicines. Many medicines may harm your baby if you take them when you are pregnant. Do not take any medicines, vitamins, herbs, or supplements without first talking to your healthcare provider. Never use illegal or street drugs (such as marijuana or cocaine) while you are pregnant.     Safety tips:     Avoid hot tubs and saunas. Do not use a hot tub or sauna while you are pregnant, especially during your first trimester. Hot tubs and saunas may raise your baby's temperature and increase the risk of birth defects.      Avoid toxoplasmosis. This is an infection caused by eating raw meat or being around infected cat feces. It can cause birth defects, miscarriages, and other problems. Wash your hands after you touch raw meat. Make sure any meat is well-cooked before you eat it. Avoid raw eggs and unpasteurized milk. Use gloves or ask someone else to clean your cat's litter box while you are pregnant.       Ask your healthcare provider about travel. The most comfortable time to travel is during the second trimester. Ask your healthcare provider if you can travel after 36 weeks. You may not be able to travel in an airplane after 36 weeks. He may also recommend that you avoid long road trips.

## 2024-02-22 NOTE — ED PROVIDER NOTE - OBJECTIVE STATEMENT
23-year-old female here complaining of nausea vomiting abdominal pain in the setting of early pregnancy.  Patient reports last menstrual period was December 29, 2023.  Patient reports this is an unplanned pregnancy and that she took plan B and is planning on going to [facility].  Patient reports 1 other pregnancy was also terminated.  Patient reports pain is severe and intermittent felt throughout her abdomen.  Patient also reports dizziness and lightheadedness states she passed out earlier today when vomiting. patient also with facial rash.

## 2024-02-22 NOTE — ED PROVIDER NOTE - NSFOLLOWUPCLINICS_GEN_ALL_ED_FT
An OB/GYN physician  Obstetrics & Gynecology  .  [place]   Phone:   Fax:     [facility]  Gynceology/OB  [address] [place] [address]  Phone: [phone]  Fax:
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
## 2024-02-22 NOTE — ED PROVIDER NOTE - CCCP TRG CHIEF CMPLNT
Care Conference 11/5/21 @ noon  Dr Mendez Nephrology    [facility] Heme/Onc  Dr Mamta Johansen 1  Palliative  Patient and SO, Heidi updated.   vomiting

## 2024-02-22 NOTE — ED PROVIDER NOTE - CARE PROVIDER_API CALL
Janneth Hopper  Obstetrics and Gynecology  [address] [place] [id number]  Phone: [phone]  Fax: [phone]  Follow Up Time: 4-6 Days

## 2024-02-22 NOTE — ED PROVIDER NOTE - PHYSICAL EXAMINATION
Vital signs as available reviewed.  General:  No acute distress.  Head:  Normocephalic, atraumatic.  Eyes:  Conjunctiva pink, no icterus.  Cardiovascular:  Regular rate, no obvious murmur.  Respiratory:  Clear to auscultation, good air entry bilaterally.  Abdomen:  Soft, bilateral lower tenderness to palpation.  Musculoskeletal:  No obvious deformity.  Neurologic: Alert and oriented, moving all extremities.  Skin:  Warm and dry. petechial rash to face.

## 2024-02-22 NOTE — ED ADULT NURSE NOTE - CHIEF COMPLAINT QUOTE
24 y/o female presents axo4 ambulatory c/o nausea/vomiting. pt reports being about 6 weeks pregnant [id number] with an  scheduled for tomorrow. pt reports intermittent n/v over the past couple of weeks but became worse today, vomited about 7-9 times and is unable to retain any liquids/solids.

## 2024-02-22 NOTE — ED PROVIDER NOTE - DIFFERENTIAL DIAGNOSIS
ectopic pregnancy v early pregnancy with nausea, vomiting. dehydration, electrolyte abnormality, arrythmia Differential Diagnosis

## 2024-02-22 NOTE — ED ADULT NURSE REASSESSMENT NOTE - NS ED NURSE REASSESS COMMENT FT1
pt re evaluated by md and to be d'c/d  pt discharged stable and ambulatory in nad at present d/c instruction reinforced and pt verbalized understanding vital signs as charted  iv d'c/d  no s/s infiltration upon removal pt [facility]

## 2024-04-09 NOTE — ED PROVIDER NOTE - CPE EDP SKIN NORM
Contact with patient --assisted patient with making clinic appointment with Dr Paul.  Patient completed eval with Dr Null and voiced he would like to not have surgery but proceed with PCI.   normal...

## 2024-07-05 NOTE — ED PROVIDER NOTE - DATE/TIME 3
Intubation    Date/Time: 7/5/2024 10:22 AM    Performed by: David Lacy MD  Authorized by: Marcelo Kelly MD    Intubation:     Induction:  Intravenous    Intubated:  Postinduction    Mask Ventilation:  Easy with oral airway    Attempts:  1    Attempted By:  Resident anesthesiologist    Method of Intubation:  Direct    Blade:  Al 3    Laryngeal View Grade: Grade I - full view of cords      Difficult Airway Encountered?: No      Complications:  None    Airway Device:  Oral endotracheal tube    Airway Device Size:  8.0    Style/Cuff Inflation:  Cuffed (inflated to minimal occlusive pressure)    Tube secured:  22    Secured at:  The lips    Placement Verified By:  Capnometry    Complicating Factors:  None    Findings Post-Intubation:  BS equal bilateral and atraumatic/condition of teeth unchanged       13-Feb-2018 17:20

## 2024-09-03 ENCOUNTER — EMERGENCY (EMERGENCY)
Facility: HOSPITAL | Age: 24
LOS: 0 days | Discharge: HOME | End: 2024-09-03
Attending: STUDENT IN AN ORGANIZED HEALTH CARE EDUCATION/TRAINING PROGRAM
Payer: MEDICAID

## 2024-09-03 VITALS
TEMPERATURE: 98 F | OXYGEN SATURATION: 100 % | RESPIRATION RATE: 18 BRPM | SYSTOLIC BLOOD PRESSURE: 119 MMHG | HEART RATE: 65 BPM | DIASTOLIC BLOOD PRESSURE: 60 MMHG

## 2024-09-03 VITALS — WEIGHT: 210.54 LBS

## 2024-09-03 DIAGNOSIS — R07.9 CHEST PAIN, UNSPECIFIED: ICD-10-CM

## 2024-09-03 DIAGNOSIS — R00.1 BRADYCARDIA, UNSPECIFIED: ICD-10-CM

## 2024-09-03 DIAGNOSIS — F41.9 ANXIETY DISORDER, UNSPECIFIED: ICD-10-CM

## 2024-09-03 DIAGNOSIS — R42 DIZZINESS AND GIDDINESS: ICD-10-CM

## 2024-09-03 LAB
ALBUMIN SERPL ELPH-MCNC: 3.8 G/DL — SIGNIFICANT CHANGE UP (ref 3.3–5)
ALP SERPL-CCNC: 94 U/L — SIGNIFICANT CHANGE UP (ref 40–120)
ALT FLD-CCNC: 28 U/L — SIGNIFICANT CHANGE UP (ref 12–78)
ANION GAP SERPL CALC-SCNC: 4 MMOL/L — LOW (ref 5–17)
AST SERPL-CCNC: 19 U/L — SIGNIFICANT CHANGE UP (ref 15–37)
BASOPHILS # BLD AUTO: 0.03 K/UL — SIGNIFICANT CHANGE UP (ref 0–0.2)
BASOPHILS NFR BLD AUTO: 0.3 % — SIGNIFICANT CHANGE UP (ref 0–2)
BILIRUB SERPL-MCNC: 0.7 MG/DL — SIGNIFICANT CHANGE UP (ref 0.2–1.2)
BUN SERPL-MCNC: 14 MG/DL — SIGNIFICANT CHANGE UP (ref 7–23)
CALCIUM SERPL-MCNC: 9.4 MG/DL — SIGNIFICANT CHANGE UP (ref 8.5–10.1)
CHLORIDE SERPL-SCNC: 107 MMOL/L — SIGNIFICANT CHANGE UP (ref 96–108)
CO2 SERPL-SCNC: 28 MMOL/L — SIGNIFICANT CHANGE UP (ref 22–31)
CREAT SERPL-MCNC: 0.74 MG/DL — SIGNIFICANT CHANGE UP (ref 0.5–1.3)
D DIMER BLD IA.RAPID-MCNC: <150 NG/ML DDU — SIGNIFICANT CHANGE UP
EGFR: 115 ML/MIN/1.73M2 — SIGNIFICANT CHANGE UP
EOSINOPHIL # BLD AUTO: 0.17 K/UL — SIGNIFICANT CHANGE UP (ref 0–0.5)
EOSINOPHIL NFR BLD AUTO: 1.7 % — SIGNIFICANT CHANGE UP (ref 0–6)
GLUCOSE SERPL-MCNC: 86 MG/DL — SIGNIFICANT CHANGE UP (ref 70–99)
HCT VFR BLD CALC: 41.2 % — SIGNIFICANT CHANGE UP (ref 34.5–45)
HGB BLD-MCNC: 14.1 G/DL — SIGNIFICANT CHANGE UP (ref 11.5–15.5)
IMM GRANULOCYTES NFR BLD AUTO: 0.3 % — SIGNIFICANT CHANGE UP (ref 0–0.9)
LIDOCAIN IGE QN: 22 U/L — SIGNIFICANT CHANGE UP (ref 13–75)
LYMPHOCYTES # BLD AUTO: 3.13 K/UL — SIGNIFICANT CHANGE UP (ref 1–3.3)
LYMPHOCYTES # BLD AUTO: 32.1 % — SIGNIFICANT CHANGE UP (ref 13–44)
MAGNESIUM SERPL-MCNC: 2.1 MG/DL — SIGNIFICANT CHANGE UP (ref 1.6–2.6)
MCHC RBC-ENTMCNC: 30.1 PG — SIGNIFICANT CHANGE UP (ref 27–34)
MCHC RBC-ENTMCNC: 34.2 GM/DL — SIGNIFICANT CHANGE UP (ref 32–36)
MCV RBC AUTO: 88 FL — SIGNIFICANT CHANGE UP (ref 80–100)
MONOCYTES # BLD AUTO: 0.47 K/UL — SIGNIFICANT CHANGE UP (ref 0–0.9)
MONOCYTES NFR BLD AUTO: 4.8 % — SIGNIFICANT CHANGE UP (ref 2–14)
NEUTROPHILS # BLD AUTO: 5.92 K/UL — SIGNIFICANT CHANGE UP (ref 1.8–7.4)
NEUTROPHILS NFR BLD AUTO: 60.8 % — SIGNIFICANT CHANGE UP (ref 43–77)
PLATELET # BLD AUTO: 275 K/UL — SIGNIFICANT CHANGE UP (ref 150–400)
POCT URINE PREGNANCY TEST: NEGATIVE — SIGNIFICANT CHANGE UP
POTASSIUM SERPL-MCNC: 3.9 MMOL/L — SIGNIFICANT CHANGE UP (ref 3.5–5.3)
POTASSIUM SERPL-SCNC: 3.9 MMOL/L — SIGNIFICANT CHANGE UP (ref 3.5–5.3)
PROT SERPL-MCNC: 8.1 GM/DL — SIGNIFICANT CHANGE UP (ref 6–8.3)
RBC # BLD: 4.68 M/UL — SIGNIFICANT CHANGE UP (ref 3.8–5.2)
RBC # FLD: 12.9 % — SIGNIFICANT CHANGE UP (ref 10.3–14.5)
SODIUM SERPL-SCNC: 139 MMOL/L — SIGNIFICANT CHANGE UP (ref 135–145)
TROPONIN I, HIGH SENSITIVITY RESULT: <3 NG/L — SIGNIFICANT CHANGE UP
WBC # BLD: 9.75 K/UL — SIGNIFICANT CHANGE UP (ref 3.8–10.5)
WBC # FLD AUTO: 9.75 K/UL — SIGNIFICANT CHANGE UP (ref 3.8–10.5)

## 2024-09-03 PROCEDURE — 99285 EMERGENCY DEPT VISIT HI MDM: CPT | Mod: 25

## 2024-09-03 PROCEDURE — 93005 ELECTROCARDIOGRAM TRACING: CPT

## 2024-09-03 PROCEDURE — 83690 ASSAY OF LIPASE: CPT

## 2024-09-03 PROCEDURE — 71046 X-RAY EXAM CHEST 2 VIEWS: CPT

## 2024-09-03 PROCEDURE — 71046 X-RAY EXAM CHEST 2 VIEWS: CPT | Mod: 26

## 2024-09-03 PROCEDURE — 85379 FIBRIN DEGRADATION QUANT: CPT

## 2024-09-03 PROCEDURE — 84484 ASSAY OF TROPONIN QUANT: CPT

## 2024-09-03 PROCEDURE — 85025 COMPLETE CBC W/AUTO DIFF WBC: CPT

## 2024-09-03 PROCEDURE — 80053 COMPREHEN METABOLIC PANEL: CPT

## 2024-09-03 PROCEDURE — 36415 COLL VENOUS BLD VENIPUNCTURE: CPT

## 2024-09-03 PROCEDURE — 83735 ASSAY OF MAGNESIUM: CPT

## 2024-09-03 PROCEDURE — 81025 URINE PREGNANCY TEST: CPT

## 2024-09-03 PROCEDURE — 93010 ELECTROCARDIOGRAM REPORT: CPT

## 2024-09-03 PROCEDURE — 36000 PLACE NEEDLE IN VEIN: CPT

## 2024-09-03 PROCEDURE — 99285 EMERGENCY DEPT VISIT HI MDM: CPT

## 2024-09-03 RX ORDER — ACETAMINOPHEN 325 MG/1
650 TABLET ORAL ONCE
Refills: 0 | Status: COMPLETED | OUTPATIENT
Start: 2024-09-03 | End: 2024-09-03

## 2024-09-03 RX ADMIN — ACETAMINOPHEN 650 MILLIGRAM(S): 325 TABLET ORAL at 11:39

## 2024-09-03 NOTE — ED STATDOCS - CLINICAL SUMMARY MEDICAL DECISION MAKING FREE TEXT BOX
Low suspicion for ACS, PE, PNA however will proceed with EKG, CXR, labs, pain control, monitor, reassess. Low suspicion for ACS, PE, PNA however will proceed with EKG, CXR, labs, pain control, monitor, reassess. EKG shows normal sinus rhythm without acute ischemic changes.  If troponin and D-dimer are within normal limits will  discharge patient home with cardiology follow-up, strict return precautions.  Patient has low heart score.

## 2024-09-03 NOTE — ED ADULT NURSE NOTE - OBJECTIVE STATEMENT
Pt comes to the ED complaining of midsternal chest pain with inspiration. Pt denies any shortness of breath. Pt states that she has been having the pain intermittently for the past 2 weeks. Pt states that this am it was the worst.

## 2024-09-03 NOTE — ED ADULT NURSE NOTE - NSFALLUNIVINTERV_ED_ALL_ED
Bed/Stretcher in lowest position, wheels locked, appropriate side rails in place/Call bell, personal items and telephone in reach/Instruct patient to call for assistance before getting out of bed/chair/stretcher/Non-slip footwear applied when patient is off stretcher/[place] to call system/Physically safe environment - no spills, clutter or unnecessary equipment/Purposeful proactive rounding/Room/bathroom lighting operational, light cord in reach

## 2024-09-03 NOTE — ED STATDOCS - OBJECTIVE STATEMENT
24 y/o female with a PMHx of anxiety presents to the ED c/o CP and lightheadedness x2 months. Pt states "I feel like there is a hole in my chest." Denies fevers, cough, SOB, abd pain, diarrhea, urinary symptoms. Pt reports her mother is sick so she has not had a chance to see her doctor. No other complaints at this time. 22 y/o female with a PMHx of anxiety presents to the ED c/o CP and lightheadedness x2 months. Pt states "I feel like there is a hole in my chest." Denies fevers, cough, SOB, Leg pain or swelling, hemoptysis, history of PE/DVT, OCP use, abd pain, diarrhea, urinary symptoms. Pt reports her mother is sick so she has not had a chance to see her doctor. No other complaints at this time.

## 2024-09-03 NOTE — ED STATDOCS - NSFOLLOWUPCLINICS_GEN_ALL_ED_FT
[facility] [place] [address] [place] [address]  Phone: [phone]  Fax:

## 2024-09-03 NOTE — ED STATDOCS - PATIENT PORTAL LINK FT
You can access the FollowMyHealth Patient Portal offered by [facility] by registering at the following website: [email or web address]. By joining [employer]’s FollowMyHealth portal, you will also be able to view your health information using other applications (apps) compatible with our system.

## 2024-09-03 NOTE — ED STATDOCS - PHYSICAL EXAMINATION
Constitutional: well appearing, NAD AAOx3  Eyes: EOMI, PERRL  Head: Normocephalic atraumatic  Mouth: no airway obstruction, posterior oropharynx clear without erythema or exudate  Neck: supple  Cardiac: regular rate and rhythm, no MRG  Resp: Lungs CTAB  GI: Abd s/nt/nd  MSK: No LE edema or tenderness.   Neuro: CN2-12 intact, strength 5/5x4, sensation grossly intact  Skin: No rashes

## 2024-09-03 NOTE — ED STATDOCS - PROGRESS NOTE DETAILS
24 y/o female with a PMHx of anxiety presents to the ED c/o CP and lightheadedness x2 months. Pt states "I feel like there is a hole in my chest." Denies fevers, cough, SOB, abd pain, diarrhea, urinary symptoms. Pt reports her mother is sick so she has not had a chance to see her doctor. No other complaints at this time.  Stacy Rivera PA-C Labs, CXR, EKG unremarkable.  Pt. given copy of labs and xray results.  Return precautions provided as well as need to follow with [facility] cardiology.  Stacy Rivera PA-C

## 2024-09-03 NOTE — ED ADULT NURSE NOTE - CHPI ED NUR CONTEXT2
unknown H Plasty Text: Given the location of the defect, shape of the defect and the proximity to free margins a H-plasty was deemed most appropriate for repair. Using a sterile surgical marker, the appropriate advancement arms of the H-plasty were drawn incorporating the defect and placing the expected incisions within the relaxed skin tension lines where possible. The area thus outlined was incised deep to adipose tissue with a #15 scalpel blade. The skin margins were undermined to an appropriate distance in all directions utilizing iris scissors.  The opposing advancement arms were then advanced and carried over into place in opposite direction and anchored with interrupted buried subcutaneous sutures.

## 2024-12-22 NOTE — ED ADULT TRIAGE NOTE - ESI TRIAGE ACUITY LEVEL, MLM
Continues in no acute distress. Pain intermittent and only with movement and denies any needs at this time. Vitally stable. NSR to monitor without ectopy. Awaiting all final test results for further.   
Resting comfortably at this time. No distress noted. Continues NSR without ectopy. Pain improved per patient. Remains vitally stable.   
2

## 2025-06-20 ENCOUNTER — OUTPATIENT (OUTPATIENT)
Dept: OUTPATIENT SERVICES | Facility: HOSPITAL | Age: 25
LOS: 1 days | Discharge: HOME | End: 2025-06-20
Payer: COMMERCIAL

## 2025-06-20 ENCOUNTER — RESULT REVIEW (OUTPATIENT)
Age: 25
End: 2025-06-20

## 2025-06-20 VITALS — WEIGHT: 231.93 LBS | HEIGHT: 62 IN

## 2025-06-20 DIAGNOSIS — E66.01 MORBID (SEVERE) OBESITY DUE TO EXCESS CALORIES: ICD-10-CM

## 2025-06-20 DIAGNOSIS — K29.50 UNSPECIFIED CHRONIC GASTRITIS WITHOUT BLEEDING: ICD-10-CM

## 2025-06-20 DIAGNOSIS — K21.9 GASTRO-ESOPHAGEAL REFLUX DISEASE WITHOUT ESOPHAGITIS: ICD-10-CM

## 2025-06-20 DIAGNOSIS — K29.00 ACUTE GASTRITIS WITHOUT BLEEDING: ICD-10-CM

## 2025-06-20 LAB — HCG UR QL: NEGATIVE — SIGNIFICANT CHANGE UP

## 2025-06-20 PROCEDURE — 88312 SPECIAL STAINS GROUP 1: CPT | Mod: 26

## 2025-06-20 PROCEDURE — 88305 TISSUE EXAM BY PATHOLOGIST: CPT | Mod: 26

## 2025-06-20 PROCEDURE — 88305 TISSUE EXAM BY PATHOLOGIST: CPT

## 2025-06-20 PROCEDURE — 81025 URINE PREGNANCY TEST: CPT

## 2025-06-20 PROCEDURE — 88313 SPECIAL STAINS GROUP 2: CPT

## 2025-06-20 PROCEDURE — 88312 SPECIAL STAINS GROUP 1: CPT

## 2025-06-20 PROCEDURE — 88313 SPECIAL STAINS GROUP 2: CPT | Mod: 26

## 2025-06-20 NOTE — ASU PATIENT PROFILE, ADULT - TEACHING/LEARNING FACTORS INFLUENCE READINESS TO LEARN
Patient Quality Outreach    Patient is due for the following:   Breast Cancer Screening - Mammogram    NEXT STEPS:   Schedule a office visit for mammogram    Type of outreach:    Phone, left message for patient/parent to call back.  2nd call    Next Steps:  Reach out within 90 days via Phone.    Max number of attempts reached: No. Will try again in 90 days if patient still on fail list.    Questions for provider review:    None     JULIO Leblanc  Chart routed to Care Team.      
none

## 2025-06-20 NOTE — ASU PATIENT PROFILE, ADULT - AS SC BRADEN SENSORY
The patient is Stable - Low risk of patient condition declining or worsening    Shift Goals  Clinical Goals: Pain management  Patient Goals: Ambulate  Family Goals: JEANINE    Progress made toward(s) clinical / shift goals:    Problem: Mobility  Goal: Patient's capacity to carry out activities will improve  Outcome: Progressing     Problem: Self Care  Goal: Patient will have the ability to perform ADLs independently or with assistance (bathe, groom, dress, toilet and feed)  Outcome: Progressing       Patient was able to ambulate in the room and in the hallway with supervision without the use of assistive device.      (4) no impairment

## 2025-06-26 LAB — SURGICAL PATHOLOGY STUDY: SIGNIFICANT CHANGE UP

## 2025-06-27 ENCOUNTER — NON-APPOINTMENT (OUTPATIENT)
Age: 25
End: 2025-06-27